# Patient Record
Sex: MALE | Race: WHITE | Employment: FULL TIME | URBAN - METROPOLITAN AREA
[De-identification: names, ages, dates, MRNs, and addresses within clinical notes are randomized per-mention and may not be internally consistent; named-entity substitution may affect disease eponyms.]

---

## 2017-06-20 ENCOUNTER — HOSPITAL ENCOUNTER (EMERGENCY)
Age: 62
Discharge: HOME | End: 2017-06-21
Attending: EMERGENCY MEDICINE
Payer: COMMERCIAL

## 2017-06-20 DIAGNOSIS — T78.2XXA ANAPHYLAXIS, INITIAL ENCOUNTER: ICD-10-CM

## 2017-06-20 DIAGNOSIS — T78.40XA ALLERGIC REACTION, INITIAL ENCOUNTER: Primary | ICD-10-CM

## 2017-06-20 PROCEDURE — 74011000250 HC RX REV CODE- 250: Performed by: EMERGENCY MEDICINE

## 2017-06-20 PROCEDURE — 74011250636 HC RX REV CODE- 250/636: Performed by: EMERGENCY MEDICINE

## 2017-06-20 PROCEDURE — 93005 ELECTROCARDIOGRAM TRACING: CPT

## 2017-06-20 PROCEDURE — 96375 TX/PRO/DX INJ NEW DRUG ADDON: CPT

## 2017-06-20 PROCEDURE — 99284 EMERGENCY DEPT VISIT MOD MDM: CPT

## 2017-06-20 PROCEDURE — 96374 THER/PROPH/DIAG INJ IV PUSH: CPT

## 2017-06-20 PROCEDURE — 74011000250 HC RX REV CODE- 250

## 2017-06-20 RX ORDER — SODIUM CHLORIDE 9 MG/ML
INJECTION INTRAMUSCULAR; INTRAVENOUS; SUBCUTANEOUS
Status: COMPLETED
Start: 2017-06-20 | End: 2017-06-20

## 2017-06-20 RX ORDER — FAMOTIDINE 10 MG/ML
20 INJECTION INTRAVENOUS
Status: COMPLETED | OUTPATIENT
Start: 2017-06-20 | End: 2017-06-20

## 2017-06-20 RX ADMIN — METHYLPREDNISOLONE SODIUM SUCCINATE 125 MG: 125 INJECTION, POWDER, FOR SOLUTION INTRAMUSCULAR; INTRAVENOUS at 22:59

## 2017-06-20 RX ADMIN — SODIUM CHLORIDE 10 ML: 9 INJECTION, SOLUTION INTRAMUSCULAR; INTRAVENOUS; SUBCUTANEOUS at 22:59

## 2017-06-20 RX ADMIN — FAMOTIDINE 20 MG: 10 INJECTION, SOLUTION INTRAVENOUS at 22:59

## 2017-06-21 VITALS
HEIGHT: 74 IN | OXYGEN SATURATION: 95 % | TEMPERATURE: 97.5 F | DIASTOLIC BLOOD PRESSURE: 62 MMHG | HEART RATE: 80 BPM | BODY MASS INDEX: 40.43 KG/M2 | SYSTOLIC BLOOD PRESSURE: 121 MMHG | WEIGHT: 315 LBS | RESPIRATION RATE: 18 BRPM

## 2017-06-21 LAB
ATRIAL RATE: 70 BPM
CALCULATED P AXIS, ECG09: 10 DEGREES
CALCULATED R AXIS, ECG10: -26 DEGREES
CALCULATED T AXIS, ECG11: 13 DEGREES
DIAGNOSIS, 93000: NORMAL
P-R INTERVAL, ECG05: 166 MS
Q-T INTERVAL, ECG07: 414 MS
QRS DURATION, ECG06: 106 MS
QTC CALCULATION (BEZET), ECG08: 447 MS
VENTRICULAR RATE, ECG03: 70 BPM

## 2017-06-21 RX ORDER — EPINEPHRINE 0.3 MG/.3ML
0.3 INJECTION SUBCUTANEOUS
Qty: 1 SYRINGE | Refills: 1 | Status: SHIPPED | OUTPATIENT
Start: 2017-06-21

## 2017-06-21 RX ORDER — FAMOTIDINE 20 MG/1
20 TABLET, FILM COATED ORAL 2 TIMES DAILY
Qty: 12 TAB | Refills: 0 | Status: SHIPPED | OUTPATIENT
Start: 2017-06-21

## 2017-06-21 RX ORDER — DIPHENHYDRAMINE HCL 25 MG
25 CAPSULE ORAL
Qty: 40 CAP | Refills: 0 | Status: SHIPPED | OUTPATIENT
Start: 2017-06-21

## 2017-06-21 RX ORDER — METHYLPREDNISOLONE 4 MG/1
TABLET ORAL
Qty: 1 DOSE PACK | Refills: 0 | Status: SHIPPED | OUTPATIENT
Start: 2017-06-21

## 2017-06-21 NOTE — DISCHARGE INSTRUCTIONS
Anaphylactic Reaction: Care Instructions  Your Care Instructions  A bad allergic reaction affects your whole body. Doctors call it an anaphylactic reaction. Your immune system may have reacted to food or medicine. Or maybe you had an insect bite or sting. This kind of reaction can happen the first time you come into contact with a substance. Or it may take many times before a substance causes a problem. You need to get help right away if your body reacts like this again. Follow-up care is a key part of your treatment and safety. Be sure to make and go to all appointments, and call your doctor if you are having problems. It's also a good idea to know your test results and keep a list of the medicines you take. How can you care for yourself at home? · If your doctor has prescribed medicine, such as an antihistamine, take it exactly as directed. Call your doctor if you think you are having a problem with your medicine. · Learn all you can about your allergies. You may be able to avoid a severe response when you do or don't do certain things. For instance, you can check food or drug labels for contents that might cause problems. · Your doctor may prescribe a shot of epinephrine to carry with you in case you have a severe reaction. Learn how to give yourself the shot. Keep it with you at all times. Make sure it has not . · Wear medical alert jewelry that lists your allergies. You can buy this at most drugstores. · Teach your family and friends about your allergies. Tell them what you need to avoid. Teach them what to do if you have a reaction. · Before you take any medicine, tell your doctor if you have had a bad response to any medicines in the past.  When should you call for help? Give an epinephrine shot if:  · You think you are having a severe allergic reaction. After giving an epinephrine shot call 911, even if you feel better.   Call 911 if:  · You have symptoms of a severe allergic reaction. These may include:  ¨ Sudden raised, red areas (hives) all over your body. ¨ Swelling of the throat, mouth, lips, or tongue. ¨ Trouble breathing. ¨ Passing out (losing consciousness). Or you may feel very lightheaded or suddenly feel weak, confused, or restless. · You have been given an epinephrine shot, even if you feel better. Call your doctor now or seek immediate medical care if:  · You have symptoms of an allergic reaction, such as:  ¨ A rash or hives (raised, red areas on the skin). ¨ Itching. ¨ Swelling. ¨ Belly pain, nausea, or vomiting. Watch closely for changes in your health, and be sure to contact your doctor if:  · You do not get better as expected. Where can you learn more? Go to http://ann-jasmyne.info/. Enter E186 in the search box to learn more about \"Anaphylactic Reaction: Care Instructions. \"  Current as of: September 29, 2016  Content Version: 11.3  © 7161-9965 [employer]. Care instructions adapted under license by [employer] (which disclaims liability or warranty for this information). If you have questions about a medical condition or this instruction, always ask your healthcare professional. [place] [address] any warranty or liability for your use of this information. Allergic Reaction: Care Instructions  Your Care Instructions  An allergic reaction is an excessive response from your immune system to a medicine, chemical, food, insect bite, or other substance. A reaction can range from mild to life-threatening. Some people have a mild rash, hives, and itching or stomach cramps. In severe reactions, swelling of your tongue and throat can close up your airway so that you cannot breathe. Follow-up care is a key part of your treatment and safety. Be sure to make and go to all appointments, and call your doctor if you are having problems.  It's also a good idea to know your test results and keep a list of the medicines you take. How can you care for yourself at home? · If you know what caused your allergic reaction, be sure to avoid it. Your allergy may become more severe each time you have a reaction. · Take an over-the-counter antihistamine, such as cetirizine (Zyrtec) or loratadine (Claritin), to treat mild symptoms. Read and follow directions on the label. Some antihistamines can make you feel sleepy. Do not give antihistamines to a child unless you have checked with your doctor first. Mild symptoms include sneezing or an itchy or runny nose; an itchy mouth; a few hives or mild itching; and mild nausea or stomach discomfort. · Do not scratch hives or a rash. Put a cold, moist towel on them or take cool baths to relieve itching. Put ice packs on hives, swelling, or insect stings for 10 to 15 minutes at a time. Put a thin cloth between the ice pack and your skin. Do not take hot baths or showers. They will make the itching worse. · Your doctor may prescribe a shot of epinephrine to carry with you in case you have a severe reaction. Learn how to give yourself the shot and keep it with you at all times. Make sure it is not . · Go to the emergency room every time you have a severe reaction, even if you have used your shot of epinephrine and are feeling better. Symptoms can come back after a shot. · Wear medical alert jewelry that lists your allergies. You can buy this at most drugstores. · If your child has a severe allergy, make sure that his or her teachers, babysitters, coaches, and other caregivers know about the allergy. They should have an epinephrine shot, know how and when to give it, and have a plan to take your child to the hospital.  When should you call for help? Give an epinephrine shot if:  · You think you are having a severe allergic reaction. · You have symptoms in more than one body area, such as mild nausea and an itchy mouth.   After giving an epinephrine shot call 911, even if you feel better. Call 911 if:  · You have symptoms of a severe allergic reaction. These may include:  ¨ Sudden raised, red areas (hives) all over your body. ¨ Swelling of the throat, mouth, lips, or tongue. ¨ Trouble breathing. ¨ Passing out (losing consciousness). Or you may feel very lightheaded or suddenly feel weak, confused, or restless. · You have been given an epinephrine shot, even if you feel better. Call your doctor now or seek immediate medical care if:  · You have symptoms of an allergic reaction, such as:  ¨ A rash or hives (raised, red areas on the skin). ¨ Itching. ¨ Swelling. ¨ Belly pain, nausea, or vomiting. Watch closely for changes in your health, and be sure to contact your doctor if:  · You do not get better as expected. Where can you learn more? Go to http://ann-jasmyne.info/. Enter P349 in the search box to learn more about \"Allergic Reaction: Care Instructions. \"  Current as of: April 3, 2017  Content Version: 11.3  © 0975-4965 [employer]. Care instructions adapted under license by [employer] (which disclaims liability or warranty for this information). If you have questions about a medical condition or this instruction, always ask your healthcare professional. [address] any warranty or liability for your use of this information.

## 2017-06-21 NOTE — ED NOTES
Bedside shift change report given to Marlys Prader, [facility] (oncoming nurse) by Swetha Mauro RN (offgoing nurse). Report included the following information SBAR, Kardex, ED Summary, [facility] and Recent Results.

## 2017-06-21 NOTE — ED PROVIDER NOTES
HPI Comments: Amanda Peterson is a 62 y.o. male with PMhx significant for HTN, sleep apnea who presents ambulatory to the ED with sudden onset of diffuse urticaria that onset at 2100 this evening. Pt reports associated tongue tingling, subjective fevers, diffuse itching, SOB, and chest tightness. Pt reports taking 4 tablets of benadryl at 2100 and 2 tablets of benadryl at 2115 with relief of his urticaria and itching. He reports hx of similar symptoms with Aspartame consumption noting he has to consume aspartame over a long period prior to onset of his symptoms. Pt denies consumption of aspartame or any new food in the last week. He denies any lip swelling, tongue swelling, throat swelling, nausea, vomiting, or CP. Social history significant for: - Tobacco, - EtOH, - Illicit drug use    PCP: Phan Alvarado MD    There are no other complaints, changes or physical findings at this time. Written by LYN Mays, as dictated by Lyndon Fleischer, MD.       The history is provided by the patient. No  was used. 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 Social History     Social History    Marital status: SINGLE     Spouse name: N/A    Number of children: N/A    Years of education: N/A     Occupational History    Not on file. Social History Main Topics    Smoking status: Never Smoker    Smokeless tobacco: Not on file    Alcohol use No    Drug use: No    Sexual activity: Not on file     Other Topics Concern    Not on file     Social History Narrative         ALLERGIES: Aspartame and Beta-blockers (beta-adrenergic blocking agts)    Review of Systems   Constitutional: Positive for fever (Subjective). Negative for chills. HENT: Negative. Negative for congestion, rhinorrhea, sneezing and sore throat.         + tongue itching  - tongue swelling  - lip swelling  - throat swelling   Eyes: Negative. Negative for redness and visual disturbance. Respiratory: Positive for chest tightness and shortness of breath. Negative for cough and wheezing. Cardiovascular: Negative. Negative for chest pain and leg swelling. Gastrointestinal: Negative. Negative for abdominal pain, diarrhea, nausea and vomiting. Genitourinary: Negative. Negative for difficulty urinating, discharge and frequency. Musculoskeletal: Negative. Negative for arthralgias, back pain, myalgias and neck stiffness. Skin: Positive for rash (Uticaria). Negative for color change.        + diffuse itching   Neurological: Negative. Negative for dizziness, syncope, weakness, numbness and headaches. Hematological: Negative for adenopathy. Psychiatric/Behavioral: Negative. All other systems reviewed and are negative. Patient Vitals for the past 12 hrs:   Temp Pulse Resp BP SpO2   06/21/17 0109 - - - - 97 %   06/21/17 0107 - - 18 133/66 97 %   06/20/17 2345 - - - 131/84 -   06/20/17 2149 97.5 °F (36.4 °C) 80 16 (!) 153/106 95 %     Physical Exam   Constitutional: He is oriented to person, place, and time. HENT:   Head: Atraumatic.   glossal edema or subglossal edema  No drooling   Eyes: EOM are normal.   Cardiovascular: Normal rate, regular rhythm, normal heart sounds and intact distal pulses. Exam reveals no gallop and no friction rub. No murmur heard. Pulmonary/Chest: Effort normal and breath sounds normal. No respiratory distress. He has no wheezes. He has no rales. He exhibits no tenderness. No strider  No increased work of breathing  No accessory muscle use   Abdominal: Soft. Bowel sounds are normal. He exhibits no distension and no mass. There is no tenderness. There is no rebound and no guarding.    Musculoskeletal: Normal range of motion. He exhibits no edema or tenderness. Neurological: He is alert and oriented to person, place, and time. Skin:   diffuse erythema to face, chest, and upper extremities. Psychiatric: He has a normal mood and affect. Nursing note and vitals reviewed. MDM  Number of Diagnoses or Management Options  Diagnosis management comments:   DDx: allergic reaction of unclear etiology. Given symptoms with treat with IV solu-medrol. Will have observation period in ED. Amount and/or Complexity of Data Reviewed  Tests in the medicine section of CPT®: ordered and reviewed  Review and summarize past medical records: yes  Independent visualization of images, tracings, or specimens: yes    Patient Progress  Patient progress: stable    Procedures    PROGRESS NOTE:  1:29 AM  Pt has been re-evaluated. Pt reports that he feels better. Pt denies any chest tightness, tongue tingling, or SOB. Itching and erythema has resolved. On exam pt has no strider, no increased work of breathing, no glossal or subglossal edema. Ready for discharge.    Written by Angel Mccullough, ED scribe, as dictated by Vickie Lopes MD    LABORATORY TESTS:  Recent Results (from the past 12 hour(s))   EKG, 12 LEAD, INITIAL    Collection Time: 06/20/17  9:52 PM   Result Value Ref Range    Ventricular Rate 70 BPM    Atrial Rate 70 BPM    P-R Interval 166 ms    QRS Duration 106 ms    Q-T Interval 414 ms    QTC Calculation (Bezet) 447 ms    Calculated P Axis 10 degrees    Calculated R Axis -26 degrees    Calculated T Axis 13 degrees    Diagnosis       Normal sinus rhythm  Minimal voltage criteria for LVH, may be normal variant  When compared with ECG of 03-NOV-2016 20:21,  No significant change was found       MEDICATIONS GIVEN:  Medications   methylPREDNISolone (PF) (SOLU-MEDROL) injection 125 mg (125 mg IntraVENous Given 6/20/17 8140)   famotidine (PF) (PEPCID) injection 20 mg (20 mg IntraVENous Given 6/20/17 6569)   sodium chloride 0.9 % injection (10 mL  Given 6/20/17 5290)       IMPRESSION:  1. Allergic reaction, initial encounter    2. Anaphylaxis, initial encounter        PLAN:  1. Current Discharge Medication List        2. Follow-up Information     Follow up With Details Comments [address], [place] In 1 day for re-evaluation [address] [facility] [address] [phone]      [facility] Schedule an appointment as soon as possible for a visit in 6 weeks  [address]  [address]      [facility] EM[facility]ENC[facility]PT  As needed, If symptoms worsen [address] [place] [address]  [phone]        Return to ED if worse     DISCHARGE NOTE  1:29 AM  The patient has been re-evaluated and is ready for discharge. Reviewed available results with patient. Counseled patient on diagnosis and care plan. Patient has expressed understanding, and all questions have been answered. Patient agrees with plan and agrees to follow up as recommended, or return to the ED if their symptoms worsen. Discharge instructions have been provided and explained to the patient, along with reasons to return to the ED. This note is prepared by Emanuel Quintero, acting as Scribe for Matthew Oconnor MD.    Matthew Oconnor MD: The scribe's documentation has been prepared under my direction and personally reviewed by me in its entirety. I confirm that the note above accurately reflects all work, treatment, procedures, and medical decision making performed by me.

## 2017-06-21 NOTE — ED NOTES
Dr. Lavinia Campos gave and reviewed discharge instructions with the patient. The patient verbalized understanding. The patient was given opportunity for questions. Patient discharged in stable condition to the waiting room with female visitor.

## 2017-06-21 NOTE — ED NOTES
Bedside verbal report received from [employer] RN. Patient alert and oriented x 4 and in NAD. He reports hives resolved at this time. VSS. Awaiting dispo.

## 2017-06-21 NOTE — ED NOTES
Assumed care of pt from triage. Pt complaining of itching beginning approximately 2100 tonight. Pt is allergic to aspartame but unsure if he was exposed to any allergens. Pt presents with hives over entire body. Pt had \"chest tightness\" that has resolved at this time. Pt took 4 benadryl at 2100 and 2 benadryl at 2115. Pt denies tongue swelling, but tongue tingling present. Pt complaining of \"what feels like chest congestion with a cold\" but denies SOB at this time. Pt in no acute distress at this time. MD at bedside. Call bell within reach.